# Patient Record
Sex: MALE | Race: WHITE | NOT HISPANIC OR LATINO | Employment: FULL TIME | URBAN - METROPOLITAN AREA
[De-identification: names, ages, dates, MRNs, and addresses within clinical notes are randomized per-mention and may not be internally consistent; named-entity substitution may affect disease eponyms.]

---

## 2024-10-22 ENCOUNTER — HOSPITAL ENCOUNTER (EMERGENCY)
Facility: HOSPITAL | Age: 34
Discharge: HOME | End: 2024-10-23
Attending: EMERGENCY MEDICINE | Admitting: EMERGENCY MEDICINE
Payer: COMMERCIAL

## 2024-10-22 ENCOUNTER — APPOINTMENT (OUTPATIENT)
Dept: GENERAL RADIOLOGY | Facility: HOSPITAL | Age: 34
End: 2024-10-22
Payer: COMMERCIAL

## 2024-10-22 DIAGNOSIS — R06.00 DYSPNEA, UNSPECIFIED TYPE: ICD-10-CM

## 2024-10-22 DIAGNOSIS — J45.40 MODERATE PERSISTENT ASTHMA WITHOUT COMPLICATION: Primary | ICD-10-CM

## 2024-10-22 DIAGNOSIS — R06.2 WHEEZING ON EXPIRATION: ICD-10-CM

## 2024-10-22 LAB
FLUAV SUBTYP SPEC NAA+PROBE: NOT DETECTED
FLUBV RNA ISLT QL NAA+PROBE: NOT DETECTED
RSV RNA NPH QL NAA+NON-PROBE: NOT DETECTED
S PYO AG THROAT QL: NEGATIVE
SARS-COV-2 RNA RESP QL NAA+PROBE: NOT DETECTED

## 2024-10-22 PROCEDURE — 71045 X-RAY EXAM CHEST 1 VIEW: CPT

## 2024-10-22 PROCEDURE — 87081 CULTURE SCREEN ONLY: CPT | Performed by: EMERGENCY MEDICINE

## 2024-10-22 PROCEDURE — 94799 UNLISTED PULMONARY SVC/PX: CPT

## 2024-10-22 PROCEDURE — 99283 EMERGENCY DEPT VISIT LOW MDM: CPT

## 2024-10-22 PROCEDURE — 94640 AIRWAY INHALATION TREATMENT: CPT

## 2024-10-22 PROCEDURE — 87637 SARSCOV2&INF A&B&RSV AMP PRB: CPT | Performed by: EMERGENCY MEDICINE

## 2024-10-22 PROCEDURE — 94664 DEMO&/EVAL PT USE INHALER: CPT

## 2024-10-22 PROCEDURE — 87880 STREP A ASSAY W/OPTIC: CPT | Performed by: EMERGENCY MEDICINE

## 2024-10-22 RX ORDER — PREDNISONE 50 MG/1
50 TABLET ORAL DAILY
Qty: 5 TABLET | Refills: 0 | Status: SHIPPED | OUTPATIENT
Start: 2024-10-22 | End: 2024-10-27

## 2024-10-22 RX ORDER — IPRATROPIUM BROMIDE AND ALBUTEROL SULFATE 2.5; .5 MG/3ML; MG/3ML
3 SOLUTION RESPIRATORY (INHALATION) ONCE
Status: COMPLETED | OUTPATIENT
Start: 2024-10-23 | End: 2024-10-22

## 2024-10-22 RX ADMIN — IPRATROPIUM BROMIDE AND ALBUTEROL SULFATE 3 ML: .5; 3 SOLUTION RESPIRATORY (INHALATION) at 23:53

## 2024-10-22 NOTE — Clinical Note
[facility] EMERGENCY ROOM  [address] [place] [address]  Phone: [phone]  Fax: [phone]    Justin Ruiz was seen and treated in our emergency department on 10/22/2024.  He may return to work on 10/24/2024.         Thank you for choosing [facility].    Jacey Henderson APRN

## 2024-10-22 NOTE — Clinical Note
[facility] EMERGENCY ROOM  [address] [place] [address]  Phone: [phone]  Fax: [phone]    Justin Ruiz was seen and treated in our emergency department on 10/22/2024.  He may return to work on 10/25/2024.         Thank you for choosing [facility].    Jacey Henderson APRN

## 2024-10-23 VITALS
BODY MASS INDEX: 30.26 KG/M2 | TEMPERATURE: 99.1 F | SYSTOLIC BLOOD PRESSURE: 124 MMHG | HEIGHT: 75 IN | HEART RATE: 89 BPM | WEIGHT: 243.39 LBS | OXYGEN SATURATION: 97 % | RESPIRATION RATE: 16 BRPM | DIASTOLIC BLOOD PRESSURE: 85 MMHG

## 2024-10-23 PROCEDURE — 25010000002 DEXAMETHASONE PER 1 MG: Performed by: NURSE PRACTITIONER

## 2024-10-23 RX ADMIN — DEXAMETHASONE SODIUM PHOSPHATE 10 MG: 10 INJECTION INTRAMUSCULAR; INTRAVENOUS at 00:05

## 2024-10-23 NOTE — ED PROVIDER NOTES
Time: 10:29 PM EDT  Date of encounter:  10/22/2024  Independent Historian/Clinical History and Information was obtained by:   Patient and Family    History is limited by: N/A    Chief Complaint: COUGH, FEVER, WHEEZING      History of Present Illness:    The patient is a 34 y.o. year old male who presents to the emergency department for evaluation of chest tightness and wheezing with cough and fever that he states he has had for about 1 week.  He states that he has had to use his nebulizer about 3-4 times in the last 12 hours.  He states that he had seen his primary care office yesterday was prescribed guaifenesin, cough medicine, and a Z-Fredy.  He states that he is here tonight because his symptoms are not improving.  He reports a history of asthma and states that he generally controlled fairly well with only just routine albuterol.  He states that he is having to use it more frequently and states that he is having persistent wheezing even with the increased use of his albuterol.  He does have expiratory wheezes in his upper lobes and is in no respiratory distress on exam.  He denies any hemoptysis.  He reports no leg or calf pain.  He denies any swelling to his feet or ankles.      Patient Care Team  Primary Care Provider: Provider, No Known    Past Medical History:     No Known Allergies  No past medical history on file.  No past surgical history on file.  No family history on file.    Home Medications:  Prior to Admission medications    Not on File        Social History:          Review of Systems:  Review of Systems   Constitutional:  Positive for fever. Negative for chills.   HENT:  Negative for congestion, ear pain and sore throat.    Eyes:  Negative for pain.   Respiratory:  Positive for cough, chest tightness, shortness of breath and wheezing.    Cardiovascular:  Negative for chest pain, palpitations and leg swelling.   Gastrointestinal:  Negative for abdominal pain, diarrhea, nausea and vomiting.  "  Genitourinary:  Negative for flank pain and hematuria.   Musculoskeletal:  Negative for back pain, joint swelling, neck pain and neck stiffness.   Skin:  Negative for pallor and rash.   Neurological:  Negative for seizures and headaches.   All other systems reviewed and are negative.       Physical Exam:  /85   Pulse 89   Temp 99.1 °F (37.3 °C) (Oral)   Resp 16   Ht 190.5 cm (75\")   Wt 110 kg (243 lb 6.2 oz)   SpO2 97%   BMI 30.42 kg/m²     Physical Exam  Vitals and nursing note reviewed.   Constitutional:       General: He is not in acute distress.     Appearance: Normal appearance. He is not ill-appearing or toxic-appearing.   HENT:      Head: Normocephalic and atraumatic.      Nose: Nose normal.      Mouth/Throat:      Mouth: Mucous membranes are moist.   Eyes:      General: No scleral icterus.     Conjunctiva/sclera: Conjunctivae normal.      Pupils: Pupils are equal, round, and reactive to light.   Cardiovascular:      Rate and Rhythm: Normal rate and regular rhythm.      Pulses: Normal pulses.   Pulmonary:      Effort: Pulmonary effort is normal. No respiratory distress.      Breath sounds: Wheezing present.   Abdominal:      General: Abdomen is flat. There is no distension.   Musculoskeletal:         General: No swelling or tenderness. Normal range of motion.      Cervical back: Normal range of motion and neck supple. No rigidity or tenderness.      Right lower leg: No edema.      Left lower leg: No edema.   Lymphadenopathy:      Cervical: No cervical adenopathy.   Skin:     General: Skin is warm and dry.      Capillary Refill: Capillary refill takes less than 2 seconds.      Findings: No rash.   Neurological:      General: No focal deficit present.      Mental Status: He is alert and oriented to person, place, and time. Mental status is at baseline.   Psychiatric:         Mood and Affect: Mood normal.         Behavior: Behavior normal.                Procedures:  Procedures      Medical " Decision Making:      Comorbidities that affect care:    Asthma    External Notes reviewed:    None      The following orders were placed and all results were independently analyzed by me:  Orders Placed This Encounter   Procedures    COVID-19, FLU A/B, RSV PCR 1 HR TAT - Swab, Nasopharynx    Rapid Strep A Screen - Swab, Throat    Beta Strep Culture, Throat - Swab, Throat    XR Chest 1 View       Medications Given in the Emergency Department:  Medications   ipratropium-albuterol (DUO-NEB) nebulizer solution 3 mL (3 mL Nebulization Given 10/22/24 0763)   dexAMETHasone (DECADRON) 10 MG/ML oral solution 10 mg (10 mg Oral Given 10/23/24 0005)        ED Course:         Labs:    Lab Results (last 24 hours)       Procedure Component Value Units Date/Time    COVID-19, FLU A/B, RSV PCR 1 HR TAT - Swab, Nasopharynx [[id number]]  (Normal) Collected: 10/22/24 2203    Specimen: Swab from Nasopharynx Updated: 10/22/24 2247     COVID19 Not Detected     Influenza A PCR Not Detected     Influenza B PCR Not Detected     RSV, PCR Not Detected    Narrative:      Fact sheet for providers: https://www.fda.gov/media/523934/download    Fact sheet for patients: https://www.fda.gov/media/800468/download    Test performed by PCR.    Rapid Strep A Screen - Swab, Throat [[id number]]  (Normal) Collected: 10/22/24 2238    Specimen: Swab from Throat Updated: 10/22/24 2302     Strep A Ag Negative    Beta Strep Culture, Throat - Swab, Throat [[id number]] Collected: 10/22/24 2238    Specimen: Swab from Throat Updated: 10/22/24 2302             Imaging:    XR Chest 1 View    Result Date: 10/22/2024  XR CHEST 1 VW Date of exam: 10/22/2024, 10:39 P.M., EDT. Indications: COUGH/FEVER. Comparison: None available. FINDINGS: A single frontal (AP or PA upright portable) chest radiograph reveals no cardiac enlargement and no acute infiltrate. No pneumothorax is seen. No pleural effusion.     No acute cardiopulmonary disease is seen radiographically.    Portions  of this note were completed with a voice recognition program.  Electronically Signed: Kevin Roberts MD  10/22/2024 10:51 PM EDT  Workstation ID: [id number]       Differential Diagnosis and Discussion:    Cough: Differential diagnosis includes but is not limited to pneumonia, acute bronchitis, upper respiratory infection, ACE inhibitor use, allergic reaction, epiglottitis, seasonal allergies, chemical irritants, exercise-induced asthma, viral syndrome.  Dyspnea: Differential diagnosis includes but is not limited to metabolic acidosis, neurological disorders, psychogenic, asthma, pneumothorax, upper airway obstruction, COPD, pneumonia, noncardiogenic pulmonary edema, interstitial lung disease, anemia, congestive heart failure, and pulmonary embolism    All labs were reviewed and interpreted by me.  All X-rays impressions were independently interpreted by me.    MDM  Number of Diagnoses or Management Options  Dyspnea, unspecified type: established and worsening  Moderate persistent asthma without complication: established and worsening  Wheezing on expiration: established and worsening     Amount and/or Complexity of Data Reviewed  Clinical lab tests: reviewed  Tests in the radiology section of CPT®: reviewed    Risk of Complications, Morbidity, and/or Mortality  Presenting problems: low  Diagnostic procedures: low  Management options: low    Patient Progress  Patient progress: stable             Patient Care Considerations:    LABS: I considered ordering labs, however considering the patient's complaint in stable condition I did not feel it was necessary at this time.      Consultants/Shared Management Plan:    None    Social Determinants of Health:    Patient is independent, reliable, and has access to care.       Disposition and Care Coordination:    Discharged: The patient is suitable and stable for discharge with no need for consideration of admission.    I have explained the patient´s condition, diagnoses and  treatment plan based on the information available to me at this time. I have answered questions and addressed any concerns. The patient has a good  understanding of the patient´s diagnosis, condition, and treatment plan as can be expected at this point. The vital signs have been stable. The patient´s condition is stable and appropriate for discharge from the emergency department.      The patient will pursue further outpatient evaluation with the primary care physician or other designated or consulting physician as outlined in the discharge instructions. They are agreeable to this plan of care and follow-up instructions have been explained in detail. The patient has received these instructions in written format and has expressed an understanding of the discharge instructions. The patient is aware that any significant change in condition or worsening of symptoms should prompt an immediate return to this or the closest emergency department or call to 911.  I have explained discharge medications and the need for follow up with the patient/caretakers. This was also printed in the discharge instructions. Patient was discharged with the following medications and follow up:      Medication List        New Prescriptions      predniSONE 50 MG tablet  Commonly known as: DELTASONE  Take 1 tablet by mouth Daily for 5 days.               Where to Get Your Medications        These medications were sent to [id number] - [place], [place] - [address] [address] - [phone] [facility] [phone]   [address] [facility] [address]      Phone: [phone]   predniSONE 50 MG tablet      YOUR PCP    Call   FOR FOLLOW UP       Final diagnoses:   Moderate persistent asthma without complication   Wheezing on expiration   Dyspnea, unspecified type        ED Disposition       ED Disposition   Discharge    Condition   Stable    Comment   --               This medical record created using voice  recognition software.             Jacey Henderson, SIMBA  10/23/24 0036

## 2024-10-23 NOTE — ED PROVIDER NOTES
"SHARED VISIT NOTE:    Patient is 34 y.o. year old male that presents to the ED for evaluation of cough.     Physical Exam    ED Course:    /82   Pulse 89   Temp 99.1 °F (37.3 °C) (Oral)   Resp 16   Ht 190.5 cm (75\")   Wt 110 kg (243 lb 6.2 oz)   SpO2 97%   BMI 30.42 kg/m²   Results for orders placed or performed during the hospital encounter of 10/22/24   COVID-19, FLU A/B, RSV PCR 1 HR TAT - Swab, Nasopharynx    Collection Time: 10/22/24 10:03 PM    Specimen: Nasopharynx; Swab   Result Value Ref Range    COVID19 Not Detected Not Detected - Ref. Range    Influenza A PCR Not Detected Not Detected    Influenza B PCR Not Detected Not Detected    RSV, PCR Not Detected Not Detected   Rapid Strep A Screen - Swab, Throat    Collection Time: 10/22/24 10:38 PM    Specimen: Throat; Swab   Result Value Ref Range    Strep A Ag Negative Negative     Medications - No data to display  XR Chest 1 View    Result Date: 10/22/2024  Narrative: XR CHEST 1 VW Date of exam: 10/22/2024, 10:39 P.M., EDT. Indications: COUGH/FEVER. Comparison: None available. FINDINGS: A single frontal (AP or PA upright portable) chest radiograph reveals no cardiac enlargement and no acute infiltrate. No pneumothorax is seen. No pleural effusion.     Impression: No acute cardiopulmonary disease is seen radiographically.    Portions of this note were completed with a voice recognition program.  Electronically Signed: Kevin Roberts MD  10/22/2024 10:51 PM EDT  Workstation ID: [id number]     MDM:    Procedures    All labs were reviewed and interpreted by me.  All X-rays impressions were independently interpreted by me.          SHARED VISIT ATTESTATION:    This visit was performed by both myself and an APC.  I performed the substantive portion of the medical decision making. The management plan was made or approved by me, and I take responsibility for patient management.           Phil Rodriguez MD  23:14 EDT  10/22/24         Michael, " MD Phil  10/22/24 5641

## 2024-10-23 NOTE — DISCHARGE INSTRUCTIONS
Rest, drink plenty of fluids.  Take your meds as prescribed.  Continue with your previously prescribed cough medication, your guaifenesin, and the Z-Fredy as previously prescribed.  Continue to use your home nebulizer treatments and albuterol inhaler as needed.  Follow-up with your primary care doctor in 2 days for reevaluation and further treatment as necessary and to ensure that your symptoms are getting better with time, rest, and medications.  You may take over-the-counter acetaminophen and Motrin as needed for aches and pains.  Return to the emergency department immediately for any acutely developing respiratory distress, any persistent wheezing, airway difficulties or any new or worse concerns.

## 2024-10-25 LAB — BACTERIA SPEC AEROBE CULT: NORMAL
